# Patient Record
Sex: MALE | Race: WHITE
[De-identification: names, ages, dates, MRNs, and addresses within clinical notes are randomized per-mention and may not be internally consistent; named-entity substitution may affect disease eponyms.]

---

## 2021-09-21 ENCOUNTER — HOSPITAL ENCOUNTER (EMERGENCY)
Dept: HOSPITAL 54 - ER | Age: 51
LOS: 1 days | Discharge: HOME | End: 2021-09-22
Payer: SELF-PAY

## 2021-09-21 VITALS — DIASTOLIC BLOOD PRESSURE: 66 MMHG | SYSTOLIC BLOOD PRESSURE: 121 MMHG

## 2021-09-21 VITALS — BODY MASS INDEX: 27.13 KG/M2 | HEIGHT: 68 IN | WEIGHT: 179 LBS

## 2021-09-21 DIAGNOSIS — I50.9: ICD-10-CM

## 2021-09-21 DIAGNOSIS — Z98.890: ICD-10-CM

## 2021-09-21 DIAGNOSIS — F17.200: ICD-10-CM

## 2021-09-21 DIAGNOSIS — L73.9: Primary | ICD-10-CM

## 2021-09-21 DIAGNOSIS — Z60.2: ICD-10-CM

## 2021-09-21 LAB
COLOR UR: YELLOW
PH UR STRIP: 6 [PH] (ref 5–8)
RBC #/AREA URNS HPF: (no result) /HPF (ref 0–2)
UROBILINOGEN UR STRIP-MCNC: 0.2 EU/DL
WBC #/AREA URNS HPF: (no result) /HPF (ref 0–3)

## 2021-09-21 SDOH — SOCIAL STABILITY - SOCIAL INSECURITY: PROBLEMS RELATED TO LIVING ALONE: Z60.2

## 2021-09-21 NOTE — NUR
-------------------------------------------------------------------------------

           *** Note undone in ED - 09/21/21 at 1939 by CHERRY ***           

-------------------------------------------------------------------------------

Patient discharged to home in stable condition. Written and verbal after care 
instructions given. Patient verbalizes understanding of instruction.

## 2021-09-21 NOTE — NUR
BIB [facility],BILATERAL TESTICULAR PAIN SINCE YESTERDAY,DENIES ANY TRAUMA. ON ROOM 
AIR, BREATHING EVENLY AND UNLABORED. KEPT COMFORTABLE, WILL CONTINUE TO MONITOR 
ACCORDINGLY.

## 2021-09-21 NOTE — NUR
RADIOLOGY CALLED. U/S TECH AWARE OF ULTRASOUND ORDER. CURRENTLY WORKING ON A 
DIFFERENT PROCEDURE AT THIS TIME.